# Patient Record
Sex: MALE | Race: BLACK OR AFRICAN AMERICAN | NOT HISPANIC OR LATINO | Employment: FULL TIME | URBAN - METROPOLITAN AREA
[De-identification: names, ages, dates, MRNs, and addresses within clinical notes are randomized per-mention and may not be internally consistent; named-entity substitution may affect disease eponyms.]

---

## 2018-05-17 ENCOUNTER — OFFICE VISIT (OUTPATIENT)
Dept: INTERNAL MEDICINE | Facility: CLINIC | Age: 38
End: 2018-05-17
Payer: COMMERCIAL

## 2018-05-17 ENCOUNTER — PATIENT MESSAGE (OUTPATIENT)
Dept: INTERNAL MEDICINE | Facility: CLINIC | Age: 38
End: 2018-05-17

## 2018-05-17 VITALS
WEIGHT: 315 LBS | OXYGEN SATURATION: 98 % | BODY MASS INDEX: 36.45 KG/M2 | HEART RATE: 83 BPM | TEMPERATURE: 98 F | HEIGHT: 78 IN

## 2018-05-17 DIAGNOSIS — R68.82 DECREASED LIBIDO: ICD-10-CM

## 2018-05-17 DIAGNOSIS — R05.9 COUGH: ICD-10-CM

## 2018-05-17 DIAGNOSIS — R53.83 FATIGUE, UNSPECIFIED TYPE: Primary | ICD-10-CM

## 2018-05-17 DIAGNOSIS — Z82.41 FAMILY HISTORY OF SUDDEN CARDIAC DEATH: ICD-10-CM

## 2018-05-17 DIAGNOSIS — R06.81 APNEA: ICD-10-CM

## 2018-05-17 DIAGNOSIS — R06.83 SNORING: ICD-10-CM

## 2018-05-17 PROCEDURE — 99204 OFFICE O/P NEW MOD 45 MIN: CPT | Mod: S$GLB,,, | Performed by: INTERNAL MEDICINE

## 2018-05-17 PROCEDURE — 99999 PR PBB SHADOW E&M-NEW PATIENT-LVL IV: CPT | Mod: PBBFAC,,, | Performed by: INTERNAL MEDICINE

## 2018-05-17 RX ORDER — ALBUTEROL SULFATE 90 UG/1
2 AEROSOL, METERED RESPIRATORY (INHALATION) EVERY 4 HOURS PRN
Qty: 18 G | Refills: 0 | Status: SHIPPED | OUTPATIENT
Start: 2018-05-17 | End: 2018-05-22

## 2018-05-18 NOTE — PROGRESS NOTES
Subjective:      Patient ID: Jose Orellana is a 38 y.o. male.    Chief Complaint: Establish Care    37 yo with There is no problem list on file for this patient.  Past Medical History:  No date: Asthma  No date: COPD (chronic obstructive pulmonary disease)      Here today c/o fatigue, decreased libido, snoring. Has also had mult syncopal episodes over the past years. Always preceded by coughing spell. Warnings of dizziness before syncope. No n/v, cp.  Witnessed by wife. Lasts about 10 seconds. No post ictal state. No bowel or bladder incont.     family history includes Cancer in his brother; Diabetes in his maternal grandfather; Hodgkin's lymphoma in his brother; No Known Problems in his father and mother.    Social History    Marital status:              Spouse name:                       Years of education:                 Number of children:               Occupational History    None on file    Social History Main Topics    Smoking status: Never Smoker                                                                Smokeless tobacco: Never Used                        Alcohol use: No              Drug use: No              Sexual activity: Not on file          Other Topics            Concern    None on file    Social History Narrative    None on file      History reviewed. No pertinent surgical history.        Fatigue   This is a chronic problem. The current episode started more than 1 year ago. The problem occurs constantly. Associated symptoms include coughing and fatigue. Pertinent negatives include no abdominal pain, chest pain, congestion, fever, headaches, numbness, sore throat or weakness. Nothing aggravates the symptoms. He has tried nothing for the symptoms. The treatment provided no relief.     Review of Systems   Constitutional: Positive for fatigue. Negative for fever.   HENT: Negative for congestion and sore throat.    Respiratory: Positive for cough.    Cardiovascular: Negative for  "chest pain.   Gastrointestinal: Negative for abdominal pain.   Neurological: Negative for weakness, numbness and headaches.     Objective:   Pulse 83   Temp 97.5 °F (36.4 °C) (Tympanic)   Ht 6' 6" (1.981 m)   Wt (!) 156 kg (343 lb 14.7 oz)   SpO2 98%   BMI 39.74 kg/m²     Physical Exam   Constitutional: He is oriented to person, place, and time. He appears well-developed and well-nourished. No distress.   HENT:   Head: Normocephalic and atraumatic.   Mouth/Throat: Oropharynx is clear and moist.   Eyes: EOM are normal. Pupils are equal, round, and reactive to light.   Neck: Neck supple. No thyromegaly present.   Cardiovascular: Normal rate and regular rhythm.    Pulmonary/Chest: Breath sounds normal. He has no wheezes. He has no rales.   Abdominal: Soft. Bowel sounds are normal. There is no tenderness.   Lymphadenopathy:     He has no cervical adenopathy.   Neurological: He is alert and oriented to person, place, and time.   Skin: Skin is warm and dry.   Psychiatric: He has a normal mood and affect. His behavior is normal.       Assessment:     1. Fatigue, unspecified type    2. Decreased libido    3. Snoring    4. Apnea    5. Cough    6. Family history of sudden cardiac death      Plan:   Fatigue, unspecified type  -     CBC auto differential; Future; Expected date: 05/17/2018  -     Comprehensive metabolic panel; Future; Expected date: 05/17/2018  -     TSH; Future; Expected date: 05/31/2018  -     Hemoglobin A1c; Future; Expected date: 05/17/2018  -     Sedimentation rate, manual; Future; Expected date: 05/17/2018  -     Vitamin B12; Future; Expected date: 05/17/2018  -     EKG 12-lead; Future; Expected date: 05/17/2018  -     Testosterone; Future; Expected date: 05/17/2018  -     HIV-1 and HIV-2 antibodies; Future; Expected date: 05/17/2018    Decreased libido  -     Testosterone; Future; Expected date: 05/17/2018    Snoring  -     Ambulatory referral to Pulmonology    Apnea  -     Ambulatory referral to " Pulmonology    Cough  -     X-Ray Chest PA And Lateral; Future; Expected date: 05/17/2018  -     Ambulatory referral to Pulmonology  -     albuterol 90 mcg/actuation inhaler; Inhale 2 puffs into the lungs every 4 (four) hours as needed for Wheezing.  Dispense: 18 g; Refill: 0    Family history of sudden cardiac death  -     Ambulatory referral to Cardiology        Lab Frequency Next Occurrence   CBC auto differential Once 05/17/2018   Comprehensive metabolic panel Once 05/17/2018   X-Ray Chest PA And Lateral Once 05/17/2018   TSH Once 05/31/2018   Hemoglobin A1c Once 05/17/2018   Sedimentation rate, manual Once 05/17/2018   Vitamin B12 Once 05/17/2018   EKG 12-lead Once 05/17/2018   Testosterone Once 05/17/2018   HIV-1 and HIV-2 antibodies Once 05/17/2018       Problem List Items Addressed This Visit     None      Visit Diagnoses     Fatigue, unspecified type    -  Primary    Relevant Orders    CBC auto differential    Comprehensive metabolic panel    TSH    Hemoglobin A1c    Sedimentation rate, manual    Vitamin B12    EKG 12-lead    Testosterone    HIV-1 and HIV-2 antibodies    Decreased libido        Relevant Orders    Testosterone    Snoring        Relevant Orders    Ambulatory referral to Pulmonology    Apnea        Relevant Orders    Ambulatory referral to Pulmonology    Cough        Relevant Medications    albuterol 90 mcg/actuation inhaler    Other Relevant Orders    X-Ray Chest PA And Lateral    Ambulatory referral to Pulmonology    Family history of sudden cardiac death        Relevant Orders    Ambulatory referral to Cardiology          Follow-up in about 2 weeks (around 5/31/2018).

## 2018-05-21 ENCOUNTER — PATIENT OUTREACH (OUTPATIENT)
Dept: ADMINISTRATIVE | Facility: HOSPITAL | Age: 38
End: 2018-05-21

## 2018-05-22 ENCOUNTER — TELEPHONE (OUTPATIENT)
Dept: SLEEP MEDICINE | Facility: CLINIC | Age: 38
End: 2018-05-22

## 2018-05-22 ENCOUNTER — OFFICE VISIT (OUTPATIENT)
Dept: PULMONOLOGY | Facility: CLINIC | Age: 38
End: 2018-05-22
Payer: COMMERCIAL

## 2018-05-22 ENCOUNTER — HOSPITAL ENCOUNTER (OUTPATIENT)
Dept: RADIOLOGY | Facility: HOSPITAL | Age: 38
Discharge: HOME | End: 2018-05-22
Attending: INTERNAL MEDICINE
Payer: COMMERCIAL

## 2018-05-22 VITALS
DIASTOLIC BLOOD PRESSURE: 84 MMHG | WEIGHT: 315 LBS | HEART RATE: 82 BPM | BODY MASS INDEX: 36.45 KG/M2 | OXYGEN SATURATION: 98 % | HEIGHT: 78 IN | SYSTOLIC BLOOD PRESSURE: 126 MMHG | RESPIRATION RATE: 16 BRPM

## 2018-05-22 DIAGNOSIS — R05.9 COUGH: ICD-10-CM

## 2018-05-22 DIAGNOSIS — R53.83 FATIGUE, UNSPECIFIED TYPE: ICD-10-CM

## 2018-05-22 DIAGNOSIS — G47.26 SHIFTING SLEEP-WORK SCHEDULE: ICD-10-CM

## 2018-05-22 DIAGNOSIS — R06.83 SNORING: ICD-10-CM

## 2018-05-22 DIAGNOSIS — E66.01 MORBID OBESITY WITH BMI OF 40.0-44.9, ADULT: ICD-10-CM

## 2018-05-22 DIAGNOSIS — Z91.89 AT RISK FOR OBSTRUCTIVE SLEEP APNEA: ICD-10-CM

## 2018-05-22 DIAGNOSIS — G47.30 SLEEP-DISORDERED BREATHING: Primary | ICD-10-CM

## 2018-05-22 DIAGNOSIS — R68.82 DECREASED LIBIDO: ICD-10-CM

## 2018-05-22 DIAGNOSIS — R06.89 GASPING FOR BREATH: ICD-10-CM

## 2018-05-22 PROCEDURE — 71046 X-RAY EXAM CHEST 2 VIEWS: CPT | Mod: 26,,, | Performed by: RADIOLOGY

## 2018-05-22 PROCEDURE — 99999 PR PBB SHADOW E&M-EST. PATIENT-LVL III: CPT | Mod: PBBFAC,,, | Performed by: NURSE PRACTITIONER

## 2018-05-22 PROCEDURE — 99204 OFFICE O/P NEW MOD 45 MIN: CPT | Mod: S$GLB,,, | Performed by: NURSE PRACTITIONER

## 2018-05-22 PROCEDURE — 71046 X-RAY EXAM CHEST 2 VIEWS: CPT | Mod: TC,FY,PO

## 2018-05-22 NOTE — PATIENT INSTRUCTIONS
What Are Snoring and Obstructive Sleep Apnea?  If youve ever had a stuffed-up nose, you know the feeling of trying to breathe through a very narrow passageway. This is what happens in your throat when you snore. While you sleep, structures in your throat partially block your air passage, making the passage narrow and hard to breathe through. If the entire passage becomes blocked and you cant breathe at all, you have sleep apnea.      Snoring Obstructive sleep apnea   Snoring  If your throat structures are too large or the muscles relax too much during sleep, the air passage may be partially blocked. As air from the nose or mouth passes around this blockage, the throat structures vibrate, causing the familiar sound of snoring. At times, this sound can be so loud that snorers wake up others, or even themselves, during the night. Snoring gets worse as more and more of the air passage is blocked.  Obstructive sleep apnea  If the structures completely block the throat, air cant flow to the lungs at all. This is called apnea (meaning no breathing). Since the lungs arent getting fresh air, the brain tells the body to wake up just enough to tighten the muscles and unblock the air passage. With a loud gasp, breathing begins again. This process may be repeated over and over again throughout the night, making your sleep fragmented with a lighter stage of sleep. Even though you do not remember waking up many times during the night to a lighter sleep, you feel tired the next day. The lack of sleep and fresh air can also strain your lungs, heart, and other organs, leading to problems such as high blood pressure, heart attack, or stroke.  Problems in the nose and jaw  Problems in the structure of the nose may obstruct breathing. A crooked (deviated) septum or swollen turbinates can make snoring worse or lead to apnea. Also, a receding jaw may make the tongue sit too far back, so its more likely to block the airway when  youre asleep.        Date Last Reviewed: 7/18/2015  © 2041-4974 [employer]. [address] [place] [address]. All rights reserved. This information is not intended as a substitute for professional medical care. Always follow your healthcare professional's instructions.        Continuous Positive Air Pressure (CPAP)     A mask over the nose gently directs air into the throat to keep the airway open.   Continuous positive air pressure (CPAP) uses gentle air pressure to hold the airway open. CPAP is often the most effective treatment for sleep apnea and severe snoring. It works very well for many people. But keep in mind that it can take several adjustments before the setup is right for you.  How CPAP works  The CPAP machine  is a small portable pump beside the bed. The pump sends air through a hose, which is held over your nose and mouth by a mask. Mild air pressure is gently pushed through your airway. The air pressure nudges sagging tissues aside. This widens the airway so you can breathe better. CPAP may be combined with other kinds of therapy for sleep apnea.  Types of air pressure treatments  There are different types of CPAP. Your doctor or CPAP technician will help you decide which type is best for you:  · Basic CPAP keeps the pressure constant all night long.  · A bilevel device (BiPAP) provides more pressure when you breathe in and less when you breathe out. A BiPAP machine also may be set to provide automatic breaths to maintain breathing if you stop breathing while sleeping.  · An autoCPAP device automatically adjusts pressure throughout the night and in response to changes such as body position, sleep stage, and snoring.  Date Last Reviewed: 8/10/2015  © 7843-2713 [employer]. [address] [place], [place] [id number]. All rights reserved. This information is not intended as a substitute for professional medical care. Always follow your healthcare professional's  instructions.        Monitoring Your Sleep: Home Sleep Study    A home sleep study tracks and records body functions while youre asleep in your own bed. The results of the study will help diagnose your sleep problem and plan your treatment.  How a home sleep study works  During a sleep study, sensors attached to your body measure your breathing, oxygen level, and other body functions. You will be shown how to attach the sensors to your body. You may also have help from a technician. At bedtime you plug the sensors into a small computer and turn it on. In the morning, you will remove the sensors and return the computer so the results can be studied.  Tips  Youll be given instructions for how to set up the sensors and the computer. Doing so will be simple. For best results:  · Go through the instructions during the day so youll be ready to use the equipment at bedtime.  · Stick to your normal routine. Ask your healthcare provider if you should do anything differently the night of the study.  · If you get up during the night, reconnect the sensors to the computer or to yourself correctly.  · Get as many hours of sleep as you can.  Getting the results  The results of your sleep study need to be scored and interpreted. Once this is done, your healthcare provider will discuss the findings with you. The sleep study results will show whether you have apnea. This is when your breathing stops temporarily many times during the night, awakening you briefly.  It can also tell how severe the apnea is. The findings help your healthcare provider know which treatment or treatments may be the right ones for you.  Date Last Reviewed: 8/9/2015  © 6950-1196 [employer]. [address] [place] [address], [place] [address]. All rights reserved. This information is not intended as a substitute for professional medical care. Always follow your healthcare professional's instructions.

## 2018-05-22 NOTE — PROGRESS NOTES
Subjective:      Patient ID: Jose Orellana is a 38 y.o. male.    Patient Active Problem List   Diagnosis    Mild persistent asthma without complication     he has been referred by Laci Vargas MD for evaluation and management for   Chief Complaint   Patient presents with    Sleep Apnea     Chief Complaint: Sleep Apnea    HPI:  He presents for a sleep evaluation.   Patient has observed snoring, awakening gasping, air tossing and turning some nights, decreased sexual drive, fatigue. Shift worker.   Patient reports non restful sleep.  He denies morning headache.   He denies day time napping.  He reports recent weight gain about 10 lbs.  Cardiovascular risk factors: obesity  Day Shift 4am-4pm  Bed time is 0700 - 0900  Wake time is 0200, work at 4am.     Night shift 4pm-4am  Bedtime is 600 - 630, sometimes earlier, tries to get to bed before sun comes up.   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  Works 2-7 nights in a row, then off 2-3 days. Then rotates to days and can work 2-7days shifts before off.     Days off  Bedtime 1000 - 1100  Awake time 0700 - 0900, sometimes awakens at 2 when normal alarm goes off, able to go back to sleep.     Sleep onset is within  20 Minutes.  Sleep maintenance difficulties related to none.   Wake after sleep onset occurs none   Nocturia occurs none.  Sleep aids :  NO  Dry mouth : YES  Sleep walking:  NO  Sleep talking :  NO  Sleep eating: NO  Vivid Dreams :  NO  Cataplexy :  NO    [place] sleepiness score was 0.  Neck circumference is 49 cm. (19.2 inches).  Mallampati score 4    STOP - BANG Questionnaire:   1. Snoring : Do you snore loudly ?     YES  2. Tired : Do you often feel tired, fatigued, or sleepy during daytime?   YES  3. Observed: Has anyone observed you stop breathing during your sleep?    NO  4. Blood pressure : Do you have or are you being treated for high blood pressure?   NO  5. BMI  :BMI more than 35 kg/m2?    YES  6. Age : Age over 50 yr old?   NO  7. Neck circumference: Neck circumference greater than 40 cm?    YES  8. Gender: Gender male?    YES    SCORE: 5    High risk of ALLAN: Yes 5 - 8  Intermediate risk of ALLAN: Yes 3 - 4  Low risk of ALLAN: Yes 0 - 2    Previous Report Reviewed: lab reports, office notes and radiology reports     Past Medical History: The following portions of the patient's history were reviewed and updated as appropriate:   He  has no past surgical history on file.  His family history includes Cancer in his brother; Diabetes in his maternal grandfather; Hodgkin's lymphoma in his brother; No Known Problems in his father and mother.  He  reports that he has never smoked. He has never used smokeless tobacco. He reports that he does not drink alcohol or use drugs.  He currently has no medications in their medication list.  He has No Known Allergies..    Review of Systems   Constitutional: Negative for fever, chills, weight loss, weight gain, activity change, appetite change, fatigue and night sweats.   HENT: Negative for postnasal drip, rhinorrhea, sinus pressure, voice change and congestion.    Eyes: Negative for redness and itching.   Respiratory: Negative for snoring, cough, sputum production, chest tightness, shortness of breath, wheezing, orthopnea, asthma nighttime symptoms, dyspnea on extertion, use of rescue inhaler and somnolence.    Cardiovascular: Negative.  Negative for chest pain, palpitations and leg swelling.   Genitourinary: Negative for difficulty urinating and hematuria.   Endocrine: Negative for cold intolerance and heat intolerance.    Musculoskeletal: Negative for arthralgias, gait problem, joint swelling and myalgias.   Skin: Negative.    Gastrointestinal: Negative for nausea, vomiting, abdominal pain and acid reflux.   Neurological: Negative for dizziness, weakness, light-headedness and headaches.   Hematological: Negative for adenopathy. No excessive  "bruising.   All other systems reviewed and are negative.     Objective:   /84 (BP Location: Right arm, Patient Position: Sitting)   Pulse 82   Resp 16   Ht 6' 6" (1.981 m)   Wt (!) 157.6 kg (347 lb 7.1 oz)   SpO2 98%   BMI 40.15 kg/m²   Physical Exam   Constitutional: He is oriented to person, place, and time. He appears well-developed and well-nourished. He is active and cooperative.  Non-toxic appearance. He does not appear ill. No distress.   HENT:   Head: Normocephalic and atraumatic.   Right Ear: External ear normal.   Left Ear: External ear normal.   Nose: Nose normal.   Mouth/Throat: Oropharynx is clear and moist. No oropharyngeal exudate.   Eyes: Conjunctivae are normal.   Neck: Normal range of motion. Neck supple.   Cardiovascular: Normal rate, regular rhythm, normal heart sounds and intact distal pulses.    Pulmonary/Chest: Effort normal and breath sounds normal.   Abdominal: Soft.   Musculoskeletal: He exhibits no edema.   Neurological: He is alert and oriented to person, place, and time.   Skin: Skin is warm and dry.   Psychiatric: He has a normal mood and affect. His behavior is normal. Judgment and thought content normal.   Vitals reviewed.    Personal Diagnostic Review  Review of labs, xray's, cardiology reports.     Assessment:     1. Sleep-disordered breathing    2. Snoring    3. Fatigue, unspecified type    4. Decreased libido    5. At risk for obstructive sleep apnea    6. Morbid obesity with BMI of 40.0-44.9, adult    7. Shifting sleep-work schedule    8. Gasping for breath      Orders Placed This Encounter   Procedures    Home Sleep Studies     Standing Status:   Future     Standing Expiration Date:   5/22/2019     Plan:     Discussed diagnosis, its evaluation, treatment and usual course. All questions answered.    Problem List Items Addressed This Visit     None      Visit Diagnoses     Sleep-disordered breathing    -  Primary    Relevant Orders    Home Sleep Studies    Snoring     "    Relevant Orders    Home Sleep Studies    Fatigue, unspecified type        Relevant Orders    Home Sleep Studies    Decreased libido        Relevant Orders    Home Sleep Studies    At risk for obstructive sleep apnea        Relevant Orders    Home Sleep Studies    Morbid obesity with BMI of 40.0-44.9, adult        Relevant Orders    Home Sleep Studies    Shifting sleep-work schedule        Relevant Orders    Home Sleep Studies    Gasping for breath        Relevant Orders    Home Sleep Studies        Plan summary:  At risk for sleep apnea, gasping during sleep, snoring, fatigue, decreased libido, morbid obesity proceed with a home sleep study.     TIME SPENT WITH PATIENT:   Time spent 40 minutes in face to face  discussion concerning diagnosis, prognosis, review of lab and test results, benefits of treatment as well as management of disease, counseling of patient and coordination of care between various health  care providers . Greater than half the time spent was used for coordination of care and counseling of patient.     Follow-up for Home Sleep Study results review.

## 2018-05-24 DIAGNOSIS — Z76.89 ENCOUNTER TO ESTABLISH CARE: Primary | ICD-10-CM

## 2018-05-25 ENCOUNTER — PATIENT MESSAGE (OUTPATIENT)
Dept: CARDIOLOGY | Facility: CLINIC | Age: 38
End: 2018-05-25

## 2018-05-30 ENCOUNTER — OFFICE VISIT (OUTPATIENT)
Dept: CARDIOLOGY | Facility: CLINIC | Age: 38
End: 2018-05-30
Payer: COMMERCIAL

## 2018-05-30 ENCOUNTER — CLINICAL SUPPORT (OUTPATIENT)
Dept: CARDIOLOGY | Facility: CLINIC | Age: 38
End: 2018-05-30
Payer: COMMERCIAL

## 2018-05-30 VITALS
DIASTOLIC BLOOD PRESSURE: 70 MMHG | HEIGHT: 78 IN | WEIGHT: 315 LBS | SYSTOLIC BLOOD PRESSURE: 118 MMHG | BODY MASS INDEX: 36.45 KG/M2 | HEART RATE: 91 BPM

## 2018-05-30 DIAGNOSIS — Z82.49 FAMILY HISTORY OF PREMATURE CAD: Primary | ICD-10-CM

## 2018-05-30 DIAGNOSIS — E66.09 CLASS 2 OBESITY DUE TO EXCESS CALORIES WITHOUT SERIOUS COMORBIDITY WITH BODY MASS INDEX (BMI) OF 39.0 TO 39.9 IN ADULT: ICD-10-CM

## 2018-05-30 DIAGNOSIS — J45.30 MILD PERSISTENT ASTHMA WITHOUT COMPLICATION: Chronic | ICD-10-CM

## 2018-05-30 DIAGNOSIS — Z76.89 ENCOUNTER TO ESTABLISH CARE: ICD-10-CM

## 2018-05-30 PROBLEM — E66.812 CLASS 2 OBESITY WITH BODY MASS INDEX (BMI) OF 39.0 TO 39.9 IN ADULT: Status: ACTIVE | Noted: 2018-05-30

## 2018-05-30 PROBLEM — E66.9 CLASS 2 OBESITY WITH BODY MASS INDEX (BMI) OF 39.0 TO 39.9 IN ADULT: Status: ACTIVE | Noted: 2018-05-30

## 2018-05-30 PROCEDURE — 99999 PR PBB SHADOW E&M-EST. PATIENT-LVL III: CPT | Mod: PBBFAC,,, | Performed by: INTERNAL MEDICINE

## 2018-05-30 PROCEDURE — 93000 ELECTROCARDIOGRAM COMPLETE: CPT | Mod: S$GLB,,, | Performed by: INTERNAL MEDICINE

## 2018-05-30 PROCEDURE — 99204 OFFICE O/P NEW MOD 45 MIN: CPT | Mod: S$GLB,,, | Performed by: INTERNAL MEDICINE

## 2018-05-30 NOTE — PROGRESS NOTES
Subjective:   Patient ID:  Jose Orellana is a 38 y.o. male who presents for evaluation of Establish Care      HPI  A 37 yo obese male with fh premature cad with his dad dying of massive mi at the markos of 50 . He is physically active at work he is non smoker he does not do regular exercise.  He has  No palpitation. He has persistent cough. He has  heart burn . He aets late. Has no exertional shortness of breath. Has some feeling of lack of air. He thinks he wheezes. He snores  He does not stop breathing ha sno daytime sleepiness.no leg swelling no chest pain. He was told he has copd./  Past Medical History:   Diagnosis Date    Asthma     Class 2 obesity with body mass index (BMI) of 39.0 to 39.9 in adult 5/30/2018    COPD (chronic obstructive pulmonary disease)     Family history of premature CAD 5/30/2018       History reviewed. No pertinent surgical history.    Social History   Substance Use Topics    Smoking status: Never Smoker    Smokeless tobacco: Never Used    Alcohol use No       Family History   Problem Relation Age of Onset    No Known Problems Mother     No Known Problems Father     Diabetes Maternal Grandfather     Cancer Brother     Hodgkin's lymphoma Brother         Non       No current outpatient prescriptions on file.     No current facility-administered medications for this visit.      No current outpatient prescriptions on file prior to visit.     No current facility-administered medications on file prior to visit.        Review of patient's allergies indicates:  No Known Allergies    Review of Systems   Constitution: Negative for weakness and malaise/fatigue.   Eyes: Negative for blurred vision.   Cardiovascular: Negative for chest pain, claudication, cyanosis, dyspnea on exertion, irregular heartbeat, leg swelling, near-syncope, orthopnea, palpitations and paroxysmal nocturnal dyspnea.   Respiratory: Positive for cough, shortness of breath and snoring. Negative for  hemoptysis.    Hematologic/Lymphatic: Negative for bleeding problem. Does not bruise/bleed easily.   Skin: Negative for dry skin and itching.   Musculoskeletal: Negative for falls, muscle weakness and myalgias.   Gastrointestinal: Negative for abdominal pain, diarrhea, heartburn, hematemesis, hematochezia and melena.   Genitourinary: Negative for flank pain and hematuria.   Neurological: Negative for dizziness, focal weakness, headaches, light-headedness, numbness, paresthesias and seizures.   Psychiatric/Behavioral: Negative for altered mental status and memory loss. The patient is not nervous/anxious.    Allergic/Immunologic: Negative for hives.       Objective:   Physical Exam   Constitutional: He is oriented to person, place, and time. He appears well-developed and well-nourished. No distress.   HENT:   Head: Normocephalic and atraumatic.   Eyes: EOM are normal. Pupils are equal, round, and reactive to light. Right eye exhibits no discharge. Left eye exhibits no discharge.   Neck: Neck supple. No JVD present. No thyromegaly present.   Cardiovascular: Normal rate, regular rhythm, normal heart sounds and intact distal pulses.  Exam reveals no gallop and no friction rub.    No murmur heard.  Pulses:       Carotid pulses are 2+ on the right side, and 2+ on the left side.       Radial pulses are 2+ on the right side, and 2+ on the left side.        Femoral pulses are 2+ on the right side, and 2+ on the left side.       Popliteal pulses are 2+ on the right side, and 2+ on the left side.        Dorsalis pedis pulses are 2+ on the right side, and 2+ on the left side.        Posterior tibial pulses are 2+ on the right side, and 2+ on the left side.   nop pulsatile abdominal mass.   Pulmonary/Chest: Effort normal and breath sounds normal. No respiratory distress. He has no wheezes. He has no rales. He exhibits no tenderness.   Abdominal: Soft. Bowel sounds are normal. He exhibits no distension. There is no tenderness.  "  Obese abdomen.   Musculoskeletal: Normal range of motion. He exhibits no edema.   Neurological: He is alert and oriented to person, place, and time. No cranial nerve deficit.   Skin: Skin is warm and dry. No rash noted. He is not diaphoretic. No erythema.   Psychiatric: He has a normal mood and affect. His behavior is normal.   Nursing note and vitals reviewed.    Vitals:    05/30/18 1600   BP: 118/70   Pulse: 91   Weight: (!) 154.7 kg (341 lb)   Height: 6' 6" (1.981 m)     No results found for: CHOL  No results found for: HDL  No results found for: LDLCALC  No results found for: TRIG  No results found for: CHOLHDL    Chemistry        Component Value Date/Time     05/22/2018 0825    K 4.2 05/22/2018 0825     05/22/2018 0825    CO2 25 05/22/2018 0825    BUN 10 05/22/2018 0825    CREATININE 0.9 05/22/2018 0825    GLU 89 05/22/2018 0825        Component Value Date/Time    CALCIUM 9.7 05/22/2018 0825    ALKPHOS 59 05/22/2018 0825    AST 18 05/22/2018 0825    ALT 35 05/22/2018 0825    BILITOT 0.5 05/22/2018 0825    ESTGFRAFRICA >60.0 05/22/2018 0825    EGFRNONAA >60.0 05/22/2018 0825          Lab Results   Component Value Date    TSH 2.698 05/22/2018     No results found for: INR, PROTIME  Lab Results   Component Value Date    WBC 6.91 05/22/2018    HGB 14.1 05/22/2018    HCT 43.2 05/22/2018    MCV 91 05/22/2018     05/22/2018     BNP  @LABRCNTIP(BNP,BNPTRIAGEBLO)@  CrCl cannot be calculated (Patient's most recent lab result is older than the maximum 7 days allowed.).  Assessment:     1. Family history of premature CAD    2. Class 2 obesity due to excess calories without serious comorbidity with body mass index (BMI) of 39.0 to 39.9 in adult    3. Mild persistent asthma without complication      Has a persistent cough needs to r/o hyperactive airways provbabaly secondary to reflux.  Has stigmata for sleep apnea needs sleep eval.  As a far as risk stratification for cad needs lipids check and will get " a ct ca score .  Needs to lose weight and get regular exercise.  Plan:   Ct ca score   Lipids.  Phone review and decide on f/u  counseled about cad risk factor modification sleep apnea weight loss.  I think he need GI EVAL.

## 2018-05-30 NOTE — LETTER
May 30, 2018      Laci Vargas MD  [address] [place] [address]           [facility]  [address] [place] [address]  Phone: [phone]  Fax: [phone]          Patient: Jose Orellana   MR Number: [id number]   YOB: 1980   Date of Visit: 5/30/2018       Dear Dr. Laci Vargas:    Thank you for referring Jose Orellana to me for evaluation. Attached you will find relevant portions of my assessment and plan of care.    If you have questions, please do not hesitate to call me. I look forward to following Jose Orellana along with you.    Sincerely,    Eddie Moser MD    Enclosure  CC:  No Recipients    If you would like to receive this communication electronically, please contact [email or web address] or [phone] to request more information on [employer] access.    For providers and/or their staff who would like to refer a patient to [facility], please contact us through our one-stop-shop provider referral line, [facility], at [phone].    If you feel you have received this communication in error or would no longer like to receive these types of communications, please e-mail [email or web address]

## 2018-05-31 ENCOUNTER — HOSPITAL ENCOUNTER (OUTPATIENT)
Dept: RADIOLOGY | Facility: HOSPITAL | Age: 38
Discharge: HOME | End: 2018-05-31
Attending: INTERNAL MEDICINE
Payer: COMMERCIAL

## 2018-05-31 ENCOUNTER — OFFICE VISIT (OUTPATIENT)
Dept: INTERNAL MEDICINE | Facility: CLINIC | Age: 38
End: 2018-05-31
Payer: COMMERCIAL

## 2018-05-31 VITALS
WEIGHT: 315 LBS | BODY MASS INDEX: 36.45 KG/M2 | TEMPERATURE: 97 F | HEIGHT: 78 IN | OXYGEN SATURATION: 99 % | DIASTOLIC BLOOD PRESSURE: 82 MMHG | SYSTOLIC BLOOD PRESSURE: 124 MMHG | HEART RATE: 73 BPM

## 2018-05-31 DIAGNOSIS — E66.09 CLASS 2 OBESITY DUE TO EXCESS CALORIES WITHOUT SERIOUS COMORBIDITY WITH BODY MASS INDEX (BMI) OF 39.0 TO 39.9 IN ADULT: ICD-10-CM

## 2018-05-31 DIAGNOSIS — R68.82 DECREASED LIBIDO: ICD-10-CM

## 2018-05-31 DIAGNOSIS — Z86.59 HISTORY OF ANXIETY: ICD-10-CM

## 2018-05-31 DIAGNOSIS — F43.9 STRESS: ICD-10-CM

## 2018-05-31 DIAGNOSIS — K21.9 GASTROESOPHAGEAL REFLUX DISEASE, ESOPHAGITIS PRESENCE NOT SPECIFIED: ICD-10-CM

## 2018-05-31 DIAGNOSIS — Z23 NEED FOR DIPHTHERIA-TETANUS-PERTUSSIS (TDAP) VACCINE: ICD-10-CM

## 2018-05-31 DIAGNOSIS — Z82.49 FAMILY HISTORY OF PREMATURE CAD: ICD-10-CM

## 2018-05-31 DIAGNOSIS — Z00.00 ROUTINE GENERAL MEDICAL EXAMINATION AT A HEALTH CARE FACILITY: Primary | ICD-10-CM

## 2018-05-31 PROCEDURE — 99395 PREV VISIT EST AGE 18-39: CPT | Mod: S$GLB,,, | Performed by: INTERNAL MEDICINE

## 2018-05-31 PROCEDURE — 75571 CT HRT W/O DYE W/CA TEST: CPT | Mod: 26,,, | Performed by: RADIOLOGY

## 2018-05-31 PROCEDURE — 99999 PR PBB SHADOW E&M-EST. PATIENT-LVL III: CPT | Mod: PBBFAC,,, | Performed by: INTERNAL MEDICINE

## 2018-05-31 PROCEDURE — 75571 CT HRT W/O DYE W/CA TEST: CPT | Mod: TC,PO

## 2018-05-31 RX ORDER — ESOMEPRAZOLE MAGNESIUM 40 MG/1
40 CAPSULE, DELAYED RELEASE ORAL
Qty: 90 CAPSULE | Refills: 0 | Status: SHIPPED | OUTPATIENT
Start: 2018-05-31

## 2018-05-31 NOTE — PROGRESS NOTES
Subjective:      Patient ID: Jose Orellana is a 38 y.o. male.    Chief Complaint: Follow-up    39 yo with Patient Active Problem List:     Mild persistent asthma without complication     Family history of premature CAD     Class 2 obesity with body mass index (BMI) of 39.0 to 39.9 in adult    Past Medical History:  No date: Asthma  5/30/2018: Class 2 obesity with body mass index (BMI) of *  No date: COPD (chronic obstructive pulmonary disease)  5/30/2018: Family history of premature CAD    Here today for prev annual exam. Feeling well today. Still some fatigue. 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 Non smoker.  Did not do testosterone in morning. Will repeat. C/o frequent heartburn. Also c/o  anxiety missed mult work days due to stress per his reports. Now seeing a counselor and back at work.  Does not want meds at this time but ok with psyc evaluation.        Social History     Social History    Marital status:      Spouse name: N/A    Number of children: N/A    Years of education: N/A     Occupational History    Not on file.     Social History Main Topics    Smoking status: Never Smoker    Smokeless tobacco: Never Used    Alcohol use No    Drug use: No    Sexual activity: Not on file     Other Topics Concern    Not on file     Social History Narrative    No narrative on file     family history includes Cancer in his brother; Diabetes in his maternal grandfather; Hodgkin's lymphoma in his brother; No Known Problems in his father and mother.    Review of Systems   Constitutional: Negative for chills and fever.   HENT: Negative for ear pain and sore throat.    Cardiovascular: Negative for chest pain and palpitations.   Gastrointestinal: Negative for abdominal pain and blood in stool.   Genitourinary: Negative for dysuria and hematuria.   Neurological: Negative for seizures and syncope.   Psychiatric/Behavioral: Negative for dysphoric mood, hallucinations and suicidal ideas. The patient is  "nervous/anxious.      Objective:   /82 (BP Location: Left arm, Patient Position: Sitting)   Pulse 73   Temp 97.4 °F (36.3 °C) (Tympanic)   Ht 6' 6" (1.981 m)   Wt (!) 154.6 kg (340 lb 13.3 oz)   SpO2 99%   BMI 39.39 kg/m²     Physical Exam   Constitutional: He is oriented to person, place, and time. He appears well-developed and well-nourished. No distress.   HENT:   Head: Normocephalic and atraumatic.   Mouth/Throat: Oropharynx is clear and moist.   Eyes: EOM are normal. Pupils are equal, round, and reactive to light.   Neck: Neck supple. No thyromegaly present.   Cardiovascular: Normal rate and regular rhythm.    Pulmonary/Chest: Breath sounds normal. He has no wheezes. He has no rales.   Abdominal: Soft. Bowel sounds are normal. There is no tenderness.   Musculoskeletal: He exhibits no edema.   Lymphadenopathy:     He has no cervical adenopathy.   Neurological: He is alert and oriented to person, place, and time.   Skin: Skin is warm and dry.   Psychiatric: He has a normal mood and affect. His behavior is normal.       Assessment:     1. Routine general medical examination at a health care facility    2. Need for diphtheria-tetanus-pertussis (Tdap) vaccine    3. Gastroesophageal reflux disease, esophagitis presence not specified    4. Stress    5. History of anxiety    6. Decreased libido      Plan:   Routine general medical examination at a health care facility  Heart healthy diet and reg exercise  HM reviewed    Need for diphtheria-tetanus-pertussis (Tdap) vaccine  -     (In Office Administered) Tdap Vaccine    Gastroesophageal reflux disease, esophagitis presence not specified  -     esomeprazole (NEXIUM) 40 MG capsule; Take 1 capsule (40 mg total) by mouth before breakfast.  Dispense: 90 capsule; Refill: 0  -     ranitidine (ZANTAC) 150 MG tablet; Take 1 tablet (150 mg total) by mouth 2 (two) times daily. Prn heartburn  Dispense: 60 tablet; Refill: 0    Stress  -     Ambulatory referral to " Psychiatry    History of anxiety  -     Ambulatory referral to Psychiatry    Decreased libido  -     Testosterone; Future; Expected date: 11/30/2018    Other orders  -     Cancel: Lipid panel; Future; Expected date: 05/21/2018        Lab Frequency Next Occurrence   EKG 12-lead Once 05/17/2018       Problem List Items Addressed This Visit     None      Visit Diagnoses     Routine general medical examination at a health care facility    -  Primary    Need for diphtheria-tetanus-pertussis (Tdap) vaccine        Relevant Orders    (In Office Administered) Tdap Vaccine    Gastroesophageal reflux disease, esophagitis presence not specified        Relevant Medications    esomeprazole (NEXIUM) 40 MG capsule    ranitidine (ZANTAC) 150 MG tablet    Stress        Relevant Orders    Ambulatory referral to Psychiatry    History of anxiety        Relevant Orders    Ambulatory referral to Psychiatry    Decreased libido        Relevant Orders    Testosterone          Follow-up in about 3 months (around 8/31/2018), or if symptoms worsen or fail to improve.

## 2018-06-05 ENCOUNTER — TELEPHONE (OUTPATIENT)
Dept: CARDIOLOGY | Facility: CLINIC | Age: 38
End: 2018-06-05

## 2018-06-05 NOTE — TELEPHONE ENCOUNTER
Notified patient of lab and ct cardiac scoring results and recommendations.  He voiced understanding.

## 2018-06-05 NOTE — TELEPHONE ENCOUNTER
----- Message from Eddie Moser MD sent at 6/1/2018  6:34 AM CDT -----  Lipids elevated he needs to exercise lose weight low fat diet and repeat lipids in 6 months

## 2018-06-12 ENCOUNTER — PROCEDURE VISIT (OUTPATIENT)
Dept: SLEEP MEDICINE | Facility: CLINIC | Age: 38
End: 2018-06-12
Payer: COMMERCIAL

## 2018-06-12 DIAGNOSIS — R06.89 GASPING FOR BREATH: ICD-10-CM

## 2018-06-12 DIAGNOSIS — R53.83 FATIGUE, UNSPECIFIED TYPE: ICD-10-CM

## 2018-06-12 DIAGNOSIS — Z91.89 AT RISK FOR OBSTRUCTIVE SLEEP APNEA: ICD-10-CM

## 2018-06-12 DIAGNOSIS — G47.30 SLEEP-DISORDERED BREATHING: ICD-10-CM

## 2018-06-12 DIAGNOSIS — R68.82 DECREASED LIBIDO: ICD-10-CM

## 2018-06-12 DIAGNOSIS — G47.33 OSA (OBSTRUCTIVE SLEEP APNEA): ICD-10-CM

## 2018-06-12 DIAGNOSIS — G47.26 SHIFTING SLEEP-WORK SCHEDULE: ICD-10-CM

## 2018-06-12 DIAGNOSIS — E66.01 MORBID OBESITY WITH BMI OF 40.0-44.9, ADULT: ICD-10-CM

## 2018-06-12 DIAGNOSIS — R06.83 SNORING: ICD-10-CM

## 2018-06-12 PROCEDURE — 95806 SLEEP STUDY UNATT&RESP EFFT: CPT | Mod: S$GLB,,, | Performed by: INTERNAL MEDICINE

## 2018-06-12 PROCEDURE — 99499 UNLISTED E&M SERVICE: CPT | Mod: S$GLB,,, | Performed by: INTERNAL MEDICINE

## 2018-06-12 NOTE — Clinical Note
Assessment and Recommendations Test duration was 6 hr 29 min. 51 min of Respiratory signal was excluded from analysis. Lowest saturation was less than 70%. Heart rate variability was present. Average heart rate 81 beats per minute with a maximal heart rate of 128 beats per minute. Snoring was recorded continuously above 50 decibels for 100% of the study duration. Apnea-hypopnea index: 17.3 events per hour. Total events 112. Moderate obstructive sleep apnea-hypopnea syndrome. Based on the [employer] practice parameter of CPAP would be the guideline recommendation of choice. Other therapies may include ENT procedures were appropriate, significant weight loss. Inspire hypoglossal nerve stimulator and nasal PROVENT or mandibular advancement device may also be considered. Close follow up to ensure resolution of symptoms.

## 2018-06-23 PROBLEM — G47.33 OSA (OBSTRUCTIVE SLEEP APNEA): Status: ACTIVE | Noted: 2018-06-23

## 2018-06-23 NOTE — PROCEDURES
Home Sleep Studies  Date/Time: 6/23/2018 6:52 PM  Performed by: HIPOLITO AMADO  Authorized by: RADHA LEZAMA       Assessment and Recommendations  Test duration was 6 hr 29 min. 51 min of Respiratory signal was excluded from analysis. Lowest saturation  was less than 70%.  Heart rate variability was present.  Average heart rate 81 beats per minute with a maximal heart rate of 128 beats per minute.  Snoring was recorded continuously above 50 decibels for 100% of the study duration.  Apnea-hypopnea index: 17.3 events per hour. Total events 112.  Moderate obstructive sleep apnea-hypopnea syndrome.  Based on the [employer] practice parameter of CPAP would be the guideline  recommendation of choice. Other therapies may include ENT procedures were appropriate, significant weight  loss. Inspire hypoglossal nerve stimulator and nasal PROVENT or mandibular advancement device may also be  considered. Close follow up to ensure resolution of symptoms.

## 2018-06-24 ENCOUNTER — TELEPHONE (OUTPATIENT)
Dept: URGENT CARE | Facility: CLINIC | Age: 38
End: 2018-06-24

## 2018-06-24 ENCOUNTER — PATIENT MESSAGE (OUTPATIENT)
Dept: URGENT CARE | Facility: CLINIC | Age: 38
End: 2018-06-24

## 2018-11-12 ENCOUNTER — OFFICE VISIT (OUTPATIENT)
Dept: INTERNAL MEDICINE | Facility: CLINIC | Age: 38
End: 2018-11-12
Payer: COMMERCIAL

## 2018-11-12 VITALS
HEART RATE: 85 BPM | WEIGHT: 315 LBS | DIASTOLIC BLOOD PRESSURE: 84 MMHG | SYSTOLIC BLOOD PRESSURE: 116 MMHG | BODY MASS INDEX: 36.45 KG/M2 | TEMPERATURE: 99 F | OXYGEN SATURATION: 97 % | HEIGHT: 78 IN

## 2018-11-12 DIAGNOSIS — J98.01 BRONCHOSPASM: ICD-10-CM

## 2018-11-12 DIAGNOSIS — R05.9 COUGH: Primary | ICD-10-CM

## 2018-11-12 DIAGNOSIS — J45.30 MILD PERSISTENT ASTHMA WITHOUT COMPLICATION: Chronic | ICD-10-CM

## 2018-11-12 DIAGNOSIS — J45.901 EXACERBATION OF ASTHMA, UNSPECIFIED ASTHMA SEVERITY, UNSPECIFIED WHETHER PERSISTENT: ICD-10-CM

## 2018-11-12 LAB
INFLUENZA A, MOLECULAR: NEGATIVE
INFLUENZA B, MOLECULAR: NEGATIVE
SPECIMEN SOURCE: NORMAL

## 2018-11-12 PROCEDURE — 99999 PR PBB SHADOW E&M-EST. PATIENT-LVL IV: CPT | Mod: PBBFAC,,, | Performed by: INTERNAL MEDICINE

## 2018-11-12 PROCEDURE — 99214 OFFICE O/P EST MOD 30 MIN: CPT | Mod: S$GLB,,, | Performed by: INTERNAL MEDICINE

## 2018-11-12 PROCEDURE — 87502 INFLUENZA DNA AMP PROBE: CPT | Mod: PO

## 2018-11-12 PROCEDURE — 3008F BODY MASS INDEX DOCD: CPT | Mod: CPTII,S$GLB,, | Performed by: INTERNAL MEDICINE

## 2018-11-12 RX ORDER — METHYLPREDNISOLONE 4 MG/1
TABLET ORAL
Qty: 1 PACKAGE | Refills: 0 | Status: SHIPPED | OUTPATIENT
Start: 2018-11-12

## 2018-11-12 RX ORDER — BUDESONIDE AND FORMOTEROL FUMARATE DIHYDRATE 160; 4.5 UG/1; UG/1
2 AEROSOL RESPIRATORY (INHALATION) EVERY 12 HOURS
COMMUNITY
Start: 2016-11-14 | End: 2018-11-12

## 2018-11-12 RX ORDER — BENZONATATE 200 MG/1
200 CAPSULE ORAL 3 TIMES DAILY PRN
Qty: 30 CAPSULE | Refills: 0 | Status: SHIPPED | OUTPATIENT
Start: 2018-11-12 | End: 2018-11-22

## 2018-11-12 RX ORDER — AZELASTINE 1 MG/ML
2 SPRAY, METERED NASAL
COMMUNITY
Start: 2016-11-14 | End: 2018-11-12

## 2018-11-12 RX ORDER — PROMETHAZINE HYDROCHLORIDE AND DEXTROMETHORPHAN HYDROBROMIDE 6.25; 15 MG/5ML; MG/5ML
5 SYRUP ORAL NIGHTLY PRN
Qty: 180 ML | Refills: 0 | Status: SHIPPED | OUTPATIENT
Start: 2018-11-12 | End: 2020-06-26

## 2018-11-12 RX ORDER — ALBUTEROL SULFATE 90 UG/1
2 AEROSOL, METERED RESPIRATORY (INHALATION) EVERY 4 HOURS PRN
Qty: 18 G | Refills: 5 | Status: SHIPPED | OUTPATIENT
Start: 2018-11-12 | End: 2019-12-04 | Stop reason: SDUPTHER

## 2018-11-12 RX ORDER — AZITHROMYCIN 250 MG/1
TABLET, FILM COATED ORAL
Qty: 6 TABLET | Refills: 0 | Status: SHIPPED | OUTPATIENT
Start: 2018-11-12

## 2018-11-12 RX ORDER — ALBUTEROL SULFATE 90 UG/1
2 AEROSOL, METERED RESPIRATORY (INHALATION)
COMMUNITY
Start: 2016-11-14 | End: 2018-11-12 | Stop reason: SDUPTHER

## 2018-11-12 RX ORDER — FLUTICASONE PROPIONATE 50 MCG
2 SPRAY, SUSPENSION (ML) NASAL
COMMUNITY
Start: 2017-01-04 | End: 2018-11-12

## 2018-11-12 RX ORDER — LEVOCETIRIZINE DIHYDROCHLORIDE 5 MG/1
5 TABLET, FILM COATED ORAL
COMMUNITY
Start: 2017-10-04 | End: 2018-11-12

## 2018-11-12 NOTE — PROGRESS NOTES
Subjective:      Patient ID: Jose Orellana is a 38 y.o. male.    Chief Complaint: Sinus Problem (x 2 days) and Chest Congestion    Cough   This is a new problem. Episode onset: 2 days. The problem has been rapidly worsening. The problem occurs every few minutes. The cough is non-productive. Associated symptoms include headaches, nasal congestion, postnasal drip, rhinorrhea, sweats and wheezing. Pertinent negatives include no chest pain, chills, fever, hemoptysis, sore throat or shortness of breath. Associated symptoms comments: Body aches. Nothing aggravates the symptoms. Risk factors: brother with similar illness. He has tried a beta-agonist inhaler for the symptoms. The treatment provided mild relief. His past medical history is significant for asthma.      37 yo with   Patient Active Problem List   Diagnosis    Mild persistent asthma without complication    Family history of premature CAD    Class 2 obesity with body mass index (BMI) of 39.0 to 39.9 in adult    ALLAN (obstructive sleep apnea)     Past Medical History:   Diagnosis Date    Asthma     Class 2 obesity with body mass index (BMI) of 39.0 to 39.9 in adult 5/30/2018    COPD (chronic obstructive pulmonary disease)     Family history of premature CAD 5/30/2018     Here today  C/o cough    Review of Systems   Constitutional: Negative for chills and fever.   HENT: Positive for postnasal drip and rhinorrhea. Negative for sinus pain, sneezing, sore throat, trouble swallowing and voice change.    Eyes: Negative for pain and visual disturbance.   Respiratory: Positive for cough and wheezing. Negative for hemoptysis, shortness of breath and stridor.    Cardiovascular: Negative for chest pain, palpitations and leg swelling.   Gastrointestinal: Negative for abdominal pain, nausea and vomiting.   Neurological: Positive for headaches.     Objective:   /84 (BP Location: Right arm, Patient Position: Sitting)   Pulse 85   Temp 98.8 °F (37.1 °C)  "(Tympanic)   Ht 6' 6" (1.981 m)   Wt (!) 167.7 kg (369 lb 11.4 oz)   SpO2 97%   BMI 42.72 kg/m²     Physical Exam   Constitutional: He is oriented to person, place, and time. He appears well-developed and well-nourished. No distress.   HENT:   Head: Normocephalic and atraumatic.   Right Ear: Tympanic membrane normal.   Left Ear: Tympanic membrane normal.   Nose: Right sinus exhibits no maxillary sinus tenderness and no frontal sinus tenderness. Left sinus exhibits no maxillary sinus tenderness and no frontal sinus tenderness.   Mouth/Throat: Posterior oropharyngeal erythema present. No oropharyngeal exudate or posterior oropharyngeal edema.   Eyes: EOM are normal. Pupils are equal, round, and reactive to light.   Neck: Neck supple.   Cardiovascular: Normal rate and regular rhythm.   Pulmonary/Chest: Breath sounds normal. He has no wheezes. He has no rales.   Lymphadenopathy:     He has no cervical adenopathy.   Neurological: He is alert and oriented to person, place, and time.   Skin: Skin is warm and dry.   Psychiatric: He has a normal mood and affect. His behavior is normal.       Flu swab negative.  Assessment:     1. Cough    2. Bronchospasm    3. Mild persistent asthma without complication    4. Exacerbation of asthma, unspecified asthma severity, unspecified whether persistent      Plan:   Cough  -     Influenza A & B by Molecular  -     promethazine-dextromethorphan (PROMETHAZINE-DM) 6.25-15 mg/5 mL Syrp; Take 5 mLs by mouth nightly as needed (cough).  Dispense: 180 mL; Refill: 0  -     benzonatate (TESSALON) 200 MG capsule; Take 1 capsule (200 mg total) by mouth 3 (three) times daily as needed for Cough.  Dispense: 30 capsule; Refill: 0    Bronchospasm  -     Influenza A & B by Molecular  -     albuterol (PROAIR HFA) 90 mcg/actuation inhaler; Inhale 2 puffs into the lungs every 4 (four) hours as needed for Wheezing.  Dispense: 18 g; Refill: 5    Mild persistent asthma without complication  -     " Influenza A & B by Molecular  -     albuterol (PROAIR HFA) 90 mcg/actuation inhaler; Inhale 2 puffs into the lungs every 4 (four) hours as needed for Wheezing.  Dispense: 18 g; Refill: 5  -     umeclidinium (INCRUSE ELLIPTA) 62.5 mcg/actuation DsDv; Inhale 62.5 mcg into the lungs once daily.  Dispense: 90 each; Refill: 3    Exacerbation of asthma, unspecified asthma severity, unspecified whether persistent  -     methylPREDNISolone (MEDROL, ANA ROSA,) 4 mg tablet; use as directed  Dispense: 1 Package; Refill: 0  -     azithromycin (Z-ANA ROSA) 250 MG tablet; Take 2 tablets by mouth on day 1; Take 1 tablet by mouth on days 2-5  Dispense: 6 tablet; Refill: 0        flonase nasal spray 2 spays each nostril for nasal congestion, post nasal drip, runny nose    Delsym as needed for cough    Sudafed for nasal congestion    Allegra or zyrtec for allergies, post nasal drip, runny nose, watery eyes.    cepacol throat lozenges for sore throat    mucinex for chest congestion.     Tylenol or ibuprofen for pain or fever.     Lab Frequency Next Occurrence   Testosterone Once 11/30/2018       Problem List Items Addressed This Visit        Pulmonary    Mild persistent asthma without complication (Chronic)    Relevant Medications    albuterol (PROAIR HFA) 90 mcg/actuation inhaler    umeclidinium (INCRUSE ELLIPTA) 62.5 mcg/actuation DsDv    Other Relevant Orders    Influenza A & B by Molecular (Completed)      Other Visit Diagnoses     Cough    -  Primary    Relevant Medications    promethazine-dextromethorphan (PROMETHAZINE-DM) 6.25-15 mg/5 mL Syrp    benzonatate (TESSALON) 200 MG capsule    Other Relevant Orders    Influenza A & B by Molecular (Completed)    Bronchospasm        Relevant Medications    albuterol (PROAIR HFA) 90 mcg/actuation inhaler    Other Relevant Orders    Influenza A & B by Molecular (Completed)    Exacerbation of asthma, unspecified asthma severity, unspecified whether persistent        Relevant Medications     methylPREDNISolone (MEDROL, ANA ROSA,) 4 mg tablet    azithromycin (Z-ANA ROSA) 250 MG tablet          Follow-up in about 4 weeks (around 12/10/2018), or if symptoms worsen or fail to improve.

## 2018-11-13 ENCOUNTER — PATIENT MESSAGE (OUTPATIENT)
Dept: INTERNAL MEDICINE | Facility: CLINIC | Age: 38
End: 2018-11-13

## 2019-01-16 ENCOUNTER — OFFICE VISIT (OUTPATIENT)
Dept: PULMONOLOGY | Facility: CLINIC | Age: 39
End: 2019-01-16
Payer: COMMERCIAL

## 2019-01-16 ENCOUNTER — PATIENT MESSAGE (OUTPATIENT)
Dept: PULMONOLOGY | Facility: CLINIC | Age: 39
End: 2019-01-16

## 2019-01-16 VITALS
WEIGHT: 315 LBS | SYSTOLIC BLOOD PRESSURE: 122 MMHG | RESPIRATION RATE: 18 BRPM | HEIGHT: 78 IN | OXYGEN SATURATION: 97 % | BODY MASS INDEX: 36.45 KG/M2 | HEART RATE: 95 BPM | DIASTOLIC BLOOD PRESSURE: 84 MMHG

## 2019-01-16 DIAGNOSIS — G47.33 OSA (OBSTRUCTIVE SLEEP APNEA): Primary | ICD-10-CM

## 2019-01-16 DIAGNOSIS — E66.09 CLASS 2 OBESITY DUE TO EXCESS CALORIES WITHOUT SERIOUS COMORBIDITY WITH BODY MASS INDEX (BMI) OF 39.0 TO 39.9 IN ADULT: ICD-10-CM

## 2019-01-16 DIAGNOSIS — J45.30 MILD PERSISTENT ASTHMA WITHOUT COMPLICATION: Chronic | ICD-10-CM

## 2019-01-16 PROCEDURE — 99999 PR PBB SHADOW E&M-EST. PATIENT-LVL III: ICD-10-PCS | Mod: PBBFAC,,, | Performed by: NURSE PRACTITIONER

## 2019-01-16 PROCEDURE — 99214 OFFICE O/P EST MOD 30 MIN: CPT | Mod: S$GLB,,, | Performed by: NURSE PRACTITIONER

## 2019-01-16 PROCEDURE — 3008F PR BODY MASS INDEX (BMI) DOCUMENTED: ICD-10-PCS | Mod: CPTII,S$GLB,, | Performed by: NURSE PRACTITIONER

## 2019-01-16 PROCEDURE — 99214 PR OFFICE/OUTPT VISIT, EST, LEVL IV, 30-39 MIN: ICD-10-PCS | Mod: S$GLB,,, | Performed by: NURSE PRACTITIONER

## 2019-01-16 PROCEDURE — 3008F BODY MASS INDEX DOCD: CPT | Mod: CPTII,S$GLB,, | Performed by: NURSE PRACTITIONER

## 2019-01-16 PROCEDURE — 99999 PR PBB SHADOW E&M-EST. PATIENT-LVL III: CPT | Mod: PBBFAC,,, | Performed by: NURSE PRACTITIONER

## 2019-01-16 NOTE — PATIENT INSTRUCTIONS
What Are Snoring and Obstructive Sleep Apnea?  If youve ever had a stuffed-up nose, you know the feeling of trying to breathe through a very narrow passageway. This is what happens in your throat when you snore. While you sleep, structures in your throat partially block your air passage, making the passage narrow and hard to breathe through. If the entire passage becomes blocked and you cant breathe at all, you have sleep apnea.      Snoring Obstructive sleep apnea   Snoring  If your throat structures are too large or the muscles relax too much during sleep, the air passage may be partially blocked. As air from the nose or mouth passes around this blockage, the throat structures vibrate, causing the familiar sound of snoring. At times, this sound can be so loud that snorers wake up others, or even themselves, during the night. Snoring gets worse as more and more of the air passage is blocked.  Obstructive sleep apnea  If the structures completely block the throat, air cant flow to the lungs at all. This is called apnea (meaning no breathing). Since the lungs arent getting fresh air, the brain tells the body to wake up just enough to tighten the muscles and unblock the air passage. With a loud gasp, breathing begins again. This process may be repeated over and over again throughout the night, making your sleep fragmented with a lighter stage of sleep. Even though you do not remember waking up many times during the night to a lighter sleep, you feel tired the next day. The lack of sleep and fresh air can also strain your lungs, heart, and other organs, leading to problems such as high blood pressure, heart attack, or stroke.  Problems in the nose and jaw  Problems in the structure of the nose may obstruct breathing. A crooked (deviated) septum or swollen turbinates can make snoring worse or lead to apnea. Also, a receding jaw may make the tongue sit too far back, so its more likely to block the airway when  youre asleep.        Date Last Reviewed: 7/18/2015  © 7753-1759 [employer]. [address] [place] [address]. All rights reserved. This information is not intended as a substitute for professional medical care. Always follow your healthcare professional's instructions.        Continuous Positive Air Pressure (CPAP)     A mask over the nose gently directs air into the throat to keep the airway open.   Continuous positive air pressure (CPAP) uses gentle air pressure to hold the airway open. CPAP is often the most effective treatment for sleep apnea and severe snoring. It works very well for many people. But keep in mind that it can take several adjustments before the setup is right for you.  How CPAP works  The CPAP machine  is a small portable pump beside the bed. The pump sends air through a hose, which is held over your nose and mouth by a mask. Mild air pressure is gently pushed through your airway. The air pressure nudges sagging tissues aside. This widens the airway so you can breathe better. CPAP may be combined with other kinds of therapy for sleep apnea.  Types of air pressure treatments  There are different types of CPAP. Your doctor or CPAP technician will help you decide which type is best for you:  · Basic CPAP keeps the pressure constant all night long.  · A bilevel device (BiPAP) provides more pressure when you breathe in and less when you breathe out. A BiPAP machine also may be set to provide automatic breaths to maintain breathing if you stop breathing while sleeping.  · An autoCPAP device automatically adjusts pressure throughout the night and in response to changes such as body position, sleep stage, and snoring.  Date Last Reviewed: 8/10/2015  © 2666-8168 [employer]. [address] [place], [place] [address]. All rights reserved. This information is not intended as a substitute for professional medical care. Always follow your healthcare professional's  instructions.

## 2019-01-16 NOTE — ASSESSMENT & PLAN NOTE
Home Sleep Study 6/11/2018 that revealed moderate obstructive sleep apnea with AHI 17.3.   Begin Auto CPAP 6 - 20 cm

## 2019-01-16 NOTE — PROGRESS NOTES
Subjective:      Patient ID: Jose Orellana is a 38 y.o. male.    Chief Complaint: Sleep Apnea and Asthma    HPI: Jose Orellana is here for follow up for obstructive sleep apnea with review of Home Sleep Study 6/11/2018 that revealed moderate obstructive sleep apnea with AHI 17.3.   Patient reports he did not follow up after Home Sleep Study related to lost prior employment then busy with new job.  His wife prompted this visit since loud snoring and request he follow up to begin CPAP.   [place] 0. No daytime sleepiness but feel tired most days.   Bedtime: 830-0900  Awake time: 4 am  Working as  6am - 6pm.   No longer working shift work .     Asthma remains well controlled  ACT score 25, asthma scores 19 or greater indicate well controlled asthma   Patient refuses pulmonary function testing.   Answers for HPI/ROS submitted by the patient on 1/16/2019   Asthma  In the past 4 weeks, how much of the time did your asthma keep you from getting as much done at work, school, or at home?: none of the time  During the past 4 weeks, how often have you had shortness of breath?: not at all  During the past 4 weeks, how often did your asthma symptoms (Wheezing, coughing, shortness of breath, chest tightness or pain) wake you up at night or earlier that usual in the morning?: not at all  During the past 4 weeks, how often have you used your rescue inhaler or nebulizer medication (such as albuterol)?: not at all  How would you rate your asthma control during the past 4 weeks?: completely controlled   : 25    Previous Report Reviewed: lab reports and office notes     Past Medical History: The following portions of the patient's history were reviewed and updated as appropriate:   He  has no past surgical history on file.  His family history includes Cancer in his brother; Diabetes in his maternal grandfather; Hodgkin's lymphoma in his brother; No Known Problems in his father and mother.  He  reports that  " has never smoked. he has never used smokeless tobacco. He reports that he does not drink alcohol or use drugs.  He has a current medication list which includes the following prescription(s): albuterol, dextromethorphan-guaifenesin  mg, umeclidinium, azithromycin, esomeprazole, methylprednisolone, promethazine-dextromethorphan, and ranitidine.  He has No Known Allergies.    The following portions of the patient's history were reviewed and updated as appropriate: allergies, current medications, past family history, past medical history, past social history, past surgical history and problem list.    Review of Systems   Constitutional: Negative for fever, chills, weight loss, weight gain, activity change, appetite change, fatigue and night sweats.   HENT: Negative for postnasal drip, rhinorrhea, sinus pressure, voice change and congestion.    Eyes: Negative for redness and itching.   Respiratory: Negative for snoring, cough, sputum production, chest tightness, shortness of breath, wheezing, orthopnea, asthma nighttime symptoms, dyspnea on extertion, use of rescue inhaler and somnolence.    Cardiovascular: Negative.  Negative for chest pain, palpitations and leg swelling.   Genitourinary: Negative for difficulty urinating and hematuria.   Endocrine: Negative for cold intolerance and heat intolerance.    Musculoskeletal: Negative for arthralgias, gait problem, joint swelling and myalgias.   Skin: Negative.    Gastrointestinal: Negative for nausea, vomiting, abdominal pain and acid reflux.   Neurological: Negative for dizziness, weakness, light-headedness and headaches.   Hematological: Negative for adenopathy. No excessive bruising.   All other systems reviewed and are negative.     Objective:   /84   Pulse 95   Resp 18   Ht 6' 6" (1.981 m)   Wt (!) 171.3 kg (377 lb 10.4 oz)   SpO2 97%   BMI 43.64 kg/m²   Physical Exam   Constitutional: He is oriented to person, place, and time. He appears " well-developed and well-nourished. He is active and cooperative.  Non-toxic appearance. He does not appear ill. No distress.   HENT:   Head: Normocephalic and atraumatic.   Right Ear: External ear normal.   Left Ear: External ear normal.   Nose: Nose normal.   Mouth/Throat: Oropharynx is clear and moist. No oropharyngeal exudate.   Eyes: Conjunctivae are normal.   Neck: Normal range of motion. Neck supple.   Cardiovascular: Normal rate, regular rhythm, normal heart sounds and intact distal pulses.   Pulmonary/Chest: Effort normal and breath sounds normal.   Abdominal: Soft.   Musculoskeletal: He exhibits no edema.   Neurological: He is alert and oriented to person, place, and time.   Skin: Skin is warm and dry.   Psychiatric: He has a normal mood and affect. His behavior is normal. Judgment and thought content normal.   Vitals reviewed.    Personal Diagnostic Review  Home Sleep Study 6/11/2018 that revealed moderate obstructive sleep apnea with AHI 17.3.     Assessment:     1. ALLAN (obstructive sleep apnea)    2. Mild persistent asthma without complication    3. Class 2 obesity due to excess calories without serious comorbidity with body mass index (BMI) of 39.0 to 39.9 in adult      Orders Placed This Encounter   Procedures    CPAP FOR HOME USE     Order Specific Question:   Type:     Answer:   Auto CPAP     Order Specific Question:   Auto CPAP pressure setting range (cmH20):     Answer:   6-20     Order Specific Question:   Length of need (1-99 months):     Answer:   99     Order Specific Question:   Humidification:     Answer:   Heated     Order Specific Question:   Type of mask:     Answer:   FFM     Order Specific Question:   Headgear?     Answer:   Yes     Order Specific Question:   Tubing?     Answer:   Yes     Order Specific Question:   Humidifier chamber?     Answer:   Yes     Order Specific Question:   Chin strap?     Answer:   Yes     Order Specific Question:   Filters?     Answer:   Yes    CPAP/BIPAP  "SUPPLIES     90 day supply. 4 refills.     Order Specific Question:   Type of mask:     Answer:   FFM     Order Specific Question:   Headgear?     Answer:   Yes     Order Specific Question:   Tubing?     Answer:   Yes     Order Specific Question:   Humidifier chamber?     Answer:   Yes     Order Specific Question:   Chin strap?     Answer:   Yes     Order Specific Question:   Filters?     Answer:   Yes     Order Specific Question:   Length of need (1-99 months):     Answer:   99     Plan:     Problem List Items Addressed This Visit     Mild persistent asthma without complication (Chronic)     Has albuterol inhaler, used about 5 or less times a month         ALLAN (obstructive sleep apnea) - Primary     Home Sleep Study 6/11/2018 that revealed moderate obstructive sleep apnea with AHI 17.3.   Begin Auto CPAP 6 - 20 cm          Relevant Orders    CPAP FOR HOME USE    CPAP/BIPAP SUPPLIES    Class 2 obesity with body mass index (BMI) of 39.0 to 39.9 in adult     Encouraged calorie reduction and 30 minutes of exercise daily. Discussed impact of obesity on general health.                  (DME) - [facility]  Reviewed therapeutic goals for positive airway pressure therapy Auto CPAP  Ideal is usage 100% of nights for 6 - 8 hours per night. Minimum usage is 70% of night for at least 4 hours per night used.     Follow-up in about 50 days (around 3/7/2019) for CPAP compliance download not compliant at initial. patient has a "turn around at work" and can not have appointment during that 45 day period.     "

## 2019-04-25 ENCOUNTER — TELEPHONE (OUTPATIENT)
Dept: PULMONOLOGY | Facility: CLINIC | Age: 39
End: 2019-04-25

## 2019-04-25 NOTE — TELEPHONE ENCOUNTER
Telephoned left message that [facility] trying to reach him and please call them back at [phone] or call [facility] at [phone] if we could help with questions or concerns or to update on his status regarding treatment of obstructive sleep apnea.

## 2019-04-25 NOTE — TELEPHONE ENCOUNTER
----- Message from Gildardo Levy sent at 4/24/2019 11:12 AM CDT -----  Regarding: AutoPap  Contact: [phone]  I've left Mr Orellana several messages regarding his AutoPap and have yet to hear from him. Thanks!

## 2019-10-02 ENCOUNTER — PATIENT MESSAGE (OUTPATIENT)
Dept: PULMONOLOGY | Facility: CLINIC | Age: 39
End: 2019-10-02

## 2019-12-04 ENCOUNTER — PATIENT MESSAGE (OUTPATIENT)
Dept: INTERNAL MEDICINE | Facility: CLINIC | Age: 39
End: 2019-12-04

## 2019-12-04 DIAGNOSIS — J45.30 MILD PERSISTENT ASTHMA WITHOUT COMPLICATION: Chronic | ICD-10-CM

## 2019-12-04 DIAGNOSIS — J98.01 BRONCHOSPASM: ICD-10-CM

## 2019-12-04 RX ORDER — ALBUTEROL SULFATE 90 UG/1
2 AEROSOL, METERED RESPIRATORY (INHALATION) EVERY 4 HOURS PRN
Qty: 18 G | Refills: 0 | Status: SHIPPED | OUTPATIENT
Start: 2019-12-04

## 2020-01-02 ENCOUNTER — PATIENT MESSAGE (OUTPATIENT)
Dept: PULMONOLOGY | Facility: CLINIC | Age: 40
End: 2020-01-02

## 2020-06-25 ENCOUNTER — NURSE TRIAGE (OUTPATIENT)
Dept: ADMINISTRATIVE | Facility: CLINIC | Age: 40
End: 2020-06-25

## 2020-06-25 NOTE — TELEPHONE ENCOUNTER
Pt is having flu like symptoms began on Wed morning,  Dry cough, sinus congestion with nasal discharge, fever subjective, headache, sore throat, intermittent audible wheezing.  Coughing causes him to be short of breath, body aches.  Ibuprofen and sinus medication otc, as well as mucinex.  Pt has a hx of asthma, but this does not feel the same, feels like it's the flu.    [place] [facility] [place].  Gave location of [facility] nearest to him    Reason for Disposition   Patient wants to be seen    Additional Information   Negative: Severe difficulty breathing (e.g., struggling for each breath, speaks in single words)   Negative: Bluish (or gray) lips or face   Negative: Shock suspected (e.g., cold/pale/clammy skin, too weak to stand, low BP, rapid pulse)   Negative: Sounds like a life-threatening emergency to the triager   Negative: Sounds like a cold and there is no fever   Negative: Cough and there is no fever   Negative: Severe cough   Negative: Throat pain and there is no fever   Negative: Severe sore throat   Negative: Influenza vaccine reaction is suspected   Negative: Headache and stiff neck (can't touch chin to chest)   Negative: Chest pain (EXCEPTION: MILD central chest pain, present only when coughing)   Negative: Difficulty breathing that is not severe and not relieved by cleaning out the nose   Negative: Patient sounds very sick or weak to the triager   Negative: Fever > 104 F (40 C)   Negative: Fever > 101 F (38.3 C) and over 60 years of age   Negative: Fever > 100.0 F (37.8 C) and diabetes mellitus or weak immune system (e.g., HIV positive, cancer chemo, splenectomy, organ transplant, chronic steroids)   Negative: Fever > 100.0 F (37.8 C) and bedridden (e.g., nursing home patient, stroke, chronic illness, recovering from surgery)   Negative: HIGH RISK (e.g., age > 64 years, pregnant, HIV+, chronic medical condition) and flu symptoms   Negative: Using nasal washes and pain  medicine > 24 hours and sinus pain (lower forehead, cheekbone, or eye) persists   Negative: Fever present > 3 days (72 hours)   Negative: Fever returns after gone for over 24 hours and symptoms worse (or not improved)   Negative: Earache    Protocols used: INFLUENZA - SEASONAL-A-OH

## 2020-06-26 ENCOUNTER — OFFICE VISIT (OUTPATIENT)
Dept: URGENT CARE | Facility: CLINIC | Age: 40
End: 2020-06-26
Payer: MEDICAID

## 2020-06-26 VITALS
OXYGEN SATURATION: 97 % | DIASTOLIC BLOOD PRESSURE: 83 MMHG | WEIGHT: 315 LBS | BODY MASS INDEX: 45.23 KG/M2 | HEART RATE: 84 BPM | SYSTOLIC BLOOD PRESSURE: 130 MMHG | TEMPERATURE: 98 F

## 2020-06-26 DIAGNOSIS — J45.30 MILD PERSISTENT ASTHMA WITHOUT COMPLICATION: Primary | ICD-10-CM

## 2020-06-26 DIAGNOSIS — E66.01 MORBID OBESITY: ICD-10-CM

## 2020-06-26 DIAGNOSIS — R05.9 COUGH: ICD-10-CM

## 2020-06-26 DIAGNOSIS — R50.9 FEVER WITH CHILLS: ICD-10-CM

## 2020-06-26 DIAGNOSIS — R68.89 FLU-LIKE SYMPTOMS: ICD-10-CM

## 2020-06-26 LAB
CTP QC/QA: YES
POC MOLECULAR INFLUENZA A AGN: NEGATIVE
POC MOLECULAR INFLUENZA B AGN: NEGATIVE

## 2020-06-26 PROCEDURE — 99214 OFFICE O/P EST MOD 30 MIN: CPT | Mod: PBBFAC,PO | Performed by: NURSE PRACTITIONER

## 2020-06-26 PROCEDURE — 99999 PR PBB SHADOW E&M-EST. PATIENT-LVL IV: CPT | Mod: PBBFAC,,, | Performed by: NURSE PRACTITIONER

## 2020-06-26 PROCEDURE — 99999 PR PBB SHADOW E&M-EST. PATIENT-LVL IV: ICD-10-PCS | Mod: PBBFAC,,, | Performed by: NURSE PRACTITIONER

## 2020-06-26 PROCEDURE — 87502 INFLUENZA DNA AMP PROBE: CPT | Mod: PBBFAC,PO | Performed by: NURSE PRACTITIONER

## 2020-06-26 PROCEDURE — 99214 PR OFFICE/OUTPT VISIT, EST, LEVL IV, 30-39 MIN: ICD-10-PCS | Mod: S$PBB,,, | Performed by: NURSE PRACTITIONER

## 2020-06-26 PROCEDURE — 99214 OFFICE O/P EST MOD 30 MIN: CPT | Mod: S$PBB,,, | Performed by: NURSE PRACTITIONER

## 2020-06-26 PROCEDURE — U0003 INFECTIOUS AGENT DETECTION BY NUCLEIC ACID (DNA OR RNA); SEVERE ACUTE RESPIRATORY SYNDROME CORONAVIRUS 2 (SARS-COV-2) (CORONAVIRUS DISEASE [COVID-19]), AMPLIFIED PROBE TECHNIQUE, MAKING USE OF HIGH THROUGHPUT TECHNOLOGIES AS DESCRIBED BY CMS-2020-01-R: HCPCS

## 2020-06-26 RX ORDER — PROMETHAZINE HYDROCHLORIDE AND DEXTROMETHORPHAN HYDROBROMIDE 6.25; 15 MG/5ML; MG/5ML
5 SYRUP ORAL
Qty: 180 ML | Refills: 0 | Status: SHIPPED | OUTPATIENT
Start: 2020-06-26

## 2020-06-26 NOTE — LETTER
June 26, 2020    Jose Orellana  [id number]  [place] [address]             [facility]  Urgent Care  [address]  [facility] [address]  Phone: [phone]  Fax: [phone]   June 26, 2020     Patient: Jose Orellana   YOB: 1980   Date of Visit: 6/26/2020       To Whom it May Concern:    Jose Orellana was seen in my clinic on 6/26/2020. Please excuse him from work from 06/22/20-06/28/2020 He may return to work on 06/29/2020.    Please excuse him from any classes or work missed.    If you have any questions or concerns, please don't hesitate to call.    Sincerely,         Amanda Montoya NP

## 2020-06-26 NOTE — LETTER
June 26, 2020    Jose Orellana  [address]  [place] [address]             [facility]  Urgent Care  [address]  [facility] [address]  Phone: [phone]  Fax: [phone]   June 26, 2020     Patient: Jose Orellana   YOB: 1980   Date of Visit: 6/26/2020       To Whom it May Concern:    Jose Orellana was seen in my clinic on 6/26/2020. Please excuse from work from 06/24/2020-06/25/2020    Please excuse him from any classes or work missed.    If you have any questions or concerns, please don't hesitate to call.    Sincerely,         Amanda Montoya NP

## 2020-06-26 NOTE — PROGRESS NOTES
CHIEF COMPLAINT/REASON FOR VISIT:  runny nose, nasal congestion, postnasal drip, cough, SOB, fever with body aches     HISTORY OF PRESENT ILLNESS:  40 year-old obese male  complains of runny nose, nasal congestion, postnasal drip, cough, SOB, fever with body aches,  headache onset 2-3 days.  Concerned regarding COVID-19 and influenza, requesting influenza COVID screen.  Patient admits tried over-the-counter medications with no relief. Denies chest pain, sore throat, dizziness, blurred vision, nausea, vomiting, diarrhea, loss of appetite.  Patient requesting work excuse.       Past Medical History:   Diagnosis Date    Asthma     Class 2 obesity with body mass index (BMI) of 39.0 to 39.9 in adult 5/30/2018    COPD (chronic obstructive pulmonary disease)     Family history of premature CAD 5/30/2018         .History reviewed. No pertinent surgical history.      Social History     Socioeconomic History    Marital status:      Spouse name: Not on file    Number of children: Not on file    Years of education: Not on file    Highest education level: Not on file   Occupational History    Not on file   Social Needs    Financial resource strain: Not on file    Food insecurity     Worry: Not on file     Inability: Not on file    Transportation needs     Medical: Not on file     Non-medical: Not on file   Tobacco Use    Smoking status: Never Smoker    Smokeless tobacco: Never Used   Substance and Sexual Activity    Alcohol use: No    Drug use: No    Sexual activity: Not on file   Lifestyle    Physical activity     Days per week: Not on file     Minutes per session: Not on file    Stress: Not on file   Relationships    Social connections     Talks on phone: Not on file     Gets together: Not on file     Attends [employer] service: Not on file     Active member of club or organization: Not on file     Attends meetings of clubs or organizations: Not on file     Relationship status: Not on file   Other  Topics Concern    Not on file   Social History Narrative    Not on file       Family History   Problem Relation Age of Onset    No Known Problems Mother     No Known Problems Father     Diabetes Maternal Grandfather     Cancer Brother     Hodgkin's lymphoma Brother         Non         ROS:  GENERAL: fever, chills with body aches  SKIN: No rashes, itching or changes in color or texture of skin.   HEENT:  Reports runny nose, nasal congestion, headache  NODES: No masses or lesions. Denies swollen glands.   CHEST: reports cough   CARDIOVASCULAR:  Reports shortness of breath, Denies chest pain.  ABDOMEN: Appetite fine. No weight loss. Denies diarrhea, abdominal pain  MUSCULOSKELETAL: No joint stiffness or swelling. Denies back pain.  NEUROLOGIC: No history of seizures, paralysis, alteration of gait or coordination.  PSYCHIATRIC: Denies mood swings, depression or suicidal thoughts.    PE:   APPEARANCE:  Brief exam, Obese Well nourished, well developed, in mild distress.  Temp 98.2°, pulse ox 97%  V/S: Reviewed.  SKIN: Normal skin turgor, no lesions.  HEENT:  TM's poor light reflex bilateral, no facial tenderness.  CHEST:  No respiratory symptoms/distress, no wheezing  CARDIOVASCULAR: Regular rate and rhythm.  NEUROLOGIC: No sensory deficits. Gait & Posture: Normal, No cerebellar signs.  MENTAL STATUS: Patient alert, oriented x 3 & conversant.    PLAN: Lab work COVID-19 screen, POCT Influenza screen  Advise increase p.o. fluids--water/juice & rest  Simply saline nasal wash to irrigate sinuses and for congestion/runny nose.  Admits:  Phenergan DM/no refills  Cool mist humidifier/vaporizer.  Practice good handwashing.  Advise use of green tea with honey  Tylenol or Ibuprofen for fever, headache and body aches.  Warm salt water gargles for throat comfort.  Chloraseptic spray or lozenges for throat comfort.  Advise follow up with PCP in 2-3 days for recheck  Advise go to ER if symptoms worsen or fail to improve with  treatment.  Instructions, follow up, and supportive care as per AVS.  AVS provided and reviewed with patient including supportive care, follow up, and red flag symptoms.   Patient verbalizes understanding and agrees with treatment plan. Discharged from Urgent Care in stable condition.  · .Stay home except to get medical care.  · Separate yourself from other people and animals in your home  · Call ahead before visiting your doctor.  · Wear a facemask.  · Cover your coughs and sneezes.  · Clean your hands often.  · Avoid sharing personal household items.  · Clean all high-touch surfaces every day.  · Monitor your symptoms. Seek prompt medical attention if your illness is worsening (e.g., difficulty breathing). Before seeking care, call your healthcare provider.  · If you have a medical emergency and need to call 911, notify the dispatch personnel that you have, or are being evaluated for COVID-19. If possible, put on a facemask before emergency medical services arrive.  · Discontinuing home isolation. Call your provider about guidance to discontinue home isolation.     Recommended precautions for household members, intimate partners, and caregivers in a nonhealthcare setting of a patient with symptomatic laboratory-confirmed COVID-19 or a patient under investigation.  Household members, intimate partners, and caregivers in a nonhealthcare setting may have close contact with a person with symptomatic, laboratory-confirmed COVID-19 or a person under investigation. Close contacts should monitor their health; they should call their healthcare provider right away if they develop symptoms suggestive of COVID-19 (e.g., fever, cough, shortness of breath).         DIAGNOSIS:  SOB  Fever  Cough/asthma  Morbid obese  Flu like symptoms  Exposure to COVID-19

## 2020-06-26 NOTE — PATIENT INSTRUCTIONS
PLAN: Lab work COVID-19 screen, POCT Influenza screen  Advise increase p.o. fluids--water/juice & rest  Simply saline nasal wash to irrigate sinuses and for congestion/runny nose.  Admits:  Phenergan DM/no refills  Cool mist humidifier/vaporizer.  Practice good handwashing.  Advise use of green tea with honey  Tylenol or Ibuprofen for fever, headache and body aches.  Warm salt water gargles for throat comfort.  Chloraseptic spray or lozenges for throat comfort.  Advise follow up with PCP in 2-3 days for recheck  Advise go to ER if symptoms worsen or fail to improve with treatment.  Instructions, follow up, and supportive care as per AVS.  AVS provided and reviewed with patient including supportive care, follow up, and red flag symptoms.   Patient verbalizes understanding and agrees with treatment plan. Discharged from Urgent Care in stable condition.  · .Stay home except to get medical care.  · Separate yourself from other people and animals in your home  · Call ahead before visiting your doctor.  · Wear a facemask.  · Cover your coughs and sneezes.  · Clean your hands often.  · Avoid sharing personal household items.  · Clean all high-touch surfaces every day.  · Monitor your symptoms. Seek prompt medical attention if your illness is worsening (e.g., difficulty breathing). Before seeking care, call your healthcare provider.  · If you have a medical emergency and need to call 911, notify the dispatch personnel that you have, or are being evaluated for COVID-19. If possible, put on a facemask before emergency medical services arrive.  · Discontinuing home isolation. Call your provider about guidance to discontinue home isolation.     Recommended precautions for household members, intimate partners, and caregivers in a nonhealthcare setting of a patient with symptomatic laboratory-confirmed COVID-19 or a patient under investigation.  Household members, intimate partners, and caregivers in a nonhealthcare setting may  have close contact with a person with symptomatic, laboratory-confirmed COVID-19 or a person under investigation. Close contacts should monitor their health; they should call their healthcare provider right away if they develop symptoms suggestive of COVID-19 (e.g., fever, cough, shortness of breath).

## 2020-07-01 LAB — SARS-COV-2 RNA RESP QL NAA+PROBE: NOT DETECTED

## 2020-10-06 ENCOUNTER — PATIENT MESSAGE (OUTPATIENT)
Dept: INTERNAL MEDICINE | Facility: CLINIC | Age: 40
End: 2020-10-06

## 2020-10-06 ENCOUNTER — PATIENT MESSAGE (OUTPATIENT)
Dept: ADMINISTRATIVE | Facility: HOSPITAL | Age: 40
End: 2020-10-06

## 2021-03-25 ENCOUNTER — PATIENT MESSAGE (OUTPATIENT)
Dept: ADMINISTRATIVE | Facility: HOSPITAL | Age: 41
End: 2021-03-25

## 2021-05-12 ENCOUNTER — PATIENT MESSAGE (OUTPATIENT)
Dept: RESEARCH | Facility: HOSPITAL | Age: 41
End: 2021-05-12

## 2024-08-29 ENCOUNTER — TELEPHONE (OUTPATIENT)
Dept: INTERNAL MEDICINE | Facility: CLINIC | Age: 44
End: 2024-08-29
Payer: MEDICAID

## 2024-08-29 NOTE — TELEPHONE ENCOUNTER
----- Message from Marga Garza sent at 8/29/2024  1:35 PM CDT -----  Contact: [phone]  Caller is requesting an earlier appointment than what we can offer.  Caller declined first available appointment listed below.  Caller will not accept being placed on the waitlist and is requesting a message be sent to doctor.    Did you offer to schedule the next available appt and put the patient on the wait list:  n/a    When is the first available appointment: n/a    Preference of timeframe to be scheduled:  asap    Symptoms: med check     Would the patient prefer a call back or a response via [facility]:  call back    Additional Information:  Jose is calling to schedule an appt for a med check and would like to know when the appt is schedule does the provider want the pt to bring in the mask & device. Please call the pt back for advice

## 2024-10-18 ENCOUNTER — TELEPHONE (OUTPATIENT)
Dept: PULMONOLOGY | Facility: CLINIC | Age: 44
End: 2024-10-18
Payer: MEDICAID

## 2024-10-18 NOTE — TELEPHONE ENCOUNTER
----- Message from Pilar sent at 10/18/2024  3:59 PM CDT -----  States he has been denied the parts for his cpap machine. States he thinks it was recalled. States he needs a new mask, hose and filters. Please call pt [phone] or Leigh Orellana (wife) [phone]. Thank you

## 2024-11-07 ENCOUNTER — TELEPHONE (OUTPATIENT)
Dept: PULMONOLOGY | Facility: CLINIC | Age: 44
End: 2024-11-07
Payer: MEDICAID

## 2024-11-07 NOTE — TELEPHONE ENCOUNTER
Returned patients call back. Rescheduled patients appt as requested. ----- Message from Homero sent at 11/7/2024  2:30 PM CST -----  Regarding: Appt  Contact: [phone]  Patient is calling to schedule appointment no dates in epic missed yesterday appointment due to he thought it was today. Please contact pt

## 2024-11-08 ENCOUNTER — OFFICE VISIT (OUTPATIENT)
Dept: PULMONOLOGY | Facility: CLINIC | Age: 44
End: 2024-11-08
Payer: MEDICAID

## 2024-11-08 ENCOUNTER — TELEPHONE (OUTPATIENT)
Dept: PULMONOLOGY | Facility: CLINIC | Age: 44
End: 2024-11-08
Payer: MEDICAID

## 2024-11-08 DIAGNOSIS — J45.30 MILD PERSISTENT ASTHMA WITHOUT COMPLICATION: ICD-10-CM

## 2024-11-08 DIAGNOSIS — G47.33 CONTROLLED NONFAMILIAL OBSTRUCTIVE SLEEP APNEA: ICD-10-CM

## 2024-11-08 DIAGNOSIS — G47.33 OSA ON CPAP: Primary | ICD-10-CM

## 2024-11-08 DIAGNOSIS — Z71.89 CPAP USE COUNSELING: ICD-10-CM

## 2024-11-08 NOTE — PROGRESS NOTES
Initial Outpatient Pulmonary Evaluation       SUBJECTIVE:   The patient location is: Home  The chief complaint leading to consultation is: sleep problems  Visit type: Virtual visit with synchronous audio and video  Total time spent with patient: 20 min   Each patient to whom he or she provides medical services by telemedicine is:  (1) informed of the relationship between the physician and patient and the respective role of any other health care provider with respect to management of the patient; and (2) notified that he or she may decline to receive medical services by telemedicine and may withdraw from such care at any time.  Total time spent in face to face counseling and coordination of care -  15minutes over 50% of time was used in discussion of prognosis, risks, benefits of treatment, instructions and compliance with regimen .     Chief Complaint   Patient presents with    Asthma       History of Present Illness:    Patient is a 44 y.o. male past medical history of ALLAN and asthma presenting to establish care.      He does have a fullface mask [employer] machine requesting refills on his supplies.      Previously on Symbicort on Incruse currently using albuterol p.r.n..          Review of Systems   Respiratory:  Positive for apnea, snoring, shortness of breath, dyspnea on extertion and use of rescue inhaler.    Psychiatric/Behavioral:  Positive for sleep disturbance.        Review of patient's allergies indicates:  No Known Allergies    Current Outpatient Medications   Medication Sig Dispense Refill    albuterol (PROAIR HFA) 90 mcg/actuation inhaler Inhale 2 puffs into the lungs every 4 (four) hours as needed for Wheezing or Shortness of Breath. (Patient not taking: Reported on 6/26/2020) 18 g 0    azithromycin (Z-ANA ROSA) 250 MG tablet Take 2 tablets by mouth on day 1; Take 1 tablet by mouth on days 2-5 (Patient not taking: Reported on 6/26/2020) 6 tablet 0     dextromethorphan-guaifenesin  mg (MUCINEX DM)  mg per 12 hr tablet Take 1 tablet by mouth every 12 (twelve) hours.      esomeprazole (NEXIUM) 40 MG capsule Take 1 capsule (40 mg total) by mouth before breakfast. (Patient not taking: Reported on 6/26/2020) 90 capsule 0    methylPREDNISolone (MEDROL, ANA ROSA,) 4 mg tablet use as directed 1 Package 0    promethazine-dextromethorphan (PROMETHAZINE-DM) 6.25-15 mg/5 mL Syrp Take 5 mLs by mouth every 6 to 8 hours as needed. 180 mL 0    ranitidine (ZANTAC) 150 MG tablet Take 1 tablet (150 mg total) by mouth 2 (two) times daily. Prn heartburn (Patient not taking: Reported on 6/26/2020) 60 tablet 0    umeclidinium (INCRUSE ELLIPTA) 62.5 mcg/actuation DsDv Inhale 62.5 mcg into the lungs once daily. (Patient not taking: Reported on 6/26/2020) 90 each 3     No current facility-administered medications for this visit.       Past Medical History:   Diagnosis Date    Asthma     Class 2 obesity with body mass index (BMI) of 39.0 to 39.9 in adult 5/30/2018    COPD (chronic obstructive pulmonary disease)     Family history of premature CAD 5/30/2018     No past surgical history on file.  Family History   Problem Relation Name Age of Onset    No Known Problems Mother      No Known Problems Father      Diabetes Maternal Grandfather      Cancer Brother      Hodgkin's lymphoma Brother          Non     Social History     Tobacco Use    Smoking status: Never    Smokeless tobacco: Never   Substance Use Topics    Alcohol use: No    Drug use: No          OBJECTIVE:     Vital Signs (Most Recent)   ]  Wt Readings from Last 2 Encounters:   06/26/20 (!) 177.6 kg (391 lb 6.8 oz)   01/16/19 (!) 171.3 kg (377 lb 10.4 oz)         Physical Exam:  Physical Exam   Constitutional: He is oriented to person, place, and time. He appears well-developed.   Pulmonary/Chest: No respiratory distress.   Neurological: He is alert and oriented to person, place, and time.   Psychiatric: His behavior is  "normal.       Laboratory  Lab Results   Component Value Date    WBC 6.91 2018    RBC 4.77 2018    HGB 14.1 2018    HCT 43.2 2018    MCV 91 2018    MCH 29.6 2018    MCHC 32.6 2018    RDW 13.1 2018     2018    MPV 9.9 2018    GRAN 3.9 2018    GRAN 55.6 2018    LYMPH 2.4 2018    LYMPH 35.2 2018    MONO 0.4 2018    MONO 6.4 2018    EOS 0.1 2018    BASO 0.06 2018    EOSINOPHIL 1.6 2018    BASOPHIL 0.9 2018       BMP  Lab Results   Component Value Date     2018    K 4.2 2018     2018    CO2 25 2018    BUN 10 2018    CREATININE 0.9 2018    CALCIUM 9.7 2018    ANIONGAP 10 2018    ESTGFRAFRICA >60.0 2018    EGFRNONAA >60.0 2018    AST 18 2018    ALT 35 2018    PROT 7.5 2018       No results found for: "BNP"    Lab Results   Component Value Date    TSH 2.698 2018       Lab Results   Component Value Date    SEDRATE 14 (H) 2018       No results found for: "CRP"    No results found for: "IGE"    No results found for: "ASPERGILLUS"  No results found for: "AFUMIGATUSCL"     No results found for: "ACE"    Diagnostic Results:  I have personally reviewed today the following studies :    Sleep Study 2018 that revealed moderate obstructive sleep apnea with AHI 17.3.       ASSESSMENT/PLAN:     ALLAN on CPAP  -     Cancel: CPAP/BIPAP SUPPLIES  -     CPAP/BIPAP SUPPLIES    Controlled nonfamilial obstructive sleep apnea  -     CPAP/BIPAP SUPPLIES    CPAP use counseling  -     CPAP/BIPAP SUPPLIES    Mild persistent asthma without complication  -     Complete PFT with bronchodilator; Future; Expected date: 2025  -     Stress test, pulmonary; Future; Expected date: 2025  -     Fraction of  Nitric Oxide; Future; Expected date: 2025        FFM     Supplies ordered advised patient to call " [facility] and inquire about CPAP recall and replacement    Check PFT and 6 minute walk          Follow up in about 3 months (around 2/8/2025).    This note was prepared using voice recognition system and is likely to have sound alike errors that may have been overlooked even after proof reading.  Please call me with any questions    Discussed diagnosis, its evaluation, treatment and usual course. All questions answered.    Thank you for the courtesy of participating in the care of this patient    Johnie Adams MD

## 2025-02-17 ENCOUNTER — PATIENT MESSAGE (OUTPATIENT)
Dept: PULMONOLOGY | Facility: CLINIC | Age: 45
End: 2025-02-17
Payer: MEDICAID